# Patient Record
Sex: FEMALE | URBAN - METROPOLITAN AREA
[De-identification: names, ages, dates, MRNs, and addresses within clinical notes are randomized per-mention and may not be internally consistent; named-entity substitution may affect disease eponyms.]

---

## 2017-05-03 PROBLEM — 445243001 LEFT SIDED ULCERATIVE COLITIS: Status: ACTIVE | Noted: 2017-05-03

## 2022-04-30 ENCOUNTER — TELEPHONE ENCOUNTER (OUTPATIENT)
Dept: URBAN - METROPOLITAN AREA CLINIC 121 | Facility: CLINIC | Age: 57
End: 2022-04-30

## 2022-04-30 RX ORDER — MESALAMINE 800 MG/1
TABLET, DELAYED RELEASE ORAL
OUTPATIENT
Start: 2017-05-03

## 2022-04-30 RX ORDER — MESALAMINE 800 MG/1
TAKE 2 TABLETS PO TID TABLET, DELAYED RELEASE ORAL
OUTPATIENT
Start: 2019-06-27 | End: 2019-09-18

## 2022-04-30 RX ORDER — MESALAMINE 800 MG/1
TAKE 2 TABLETS PO TID TABLET, DELAYED RELEASE ORAL
OUTPATIENT
Start: 2017-05-30

## 2022-04-30 RX ORDER — MESALAMINE 800 MG/1
TAKE 2 TABLETS PO TID TABLET, DELAYED RELEASE ORAL
OUTPATIENT
Start: 2019-06-27

## 2022-05-01 ENCOUNTER — TELEPHONE ENCOUNTER (OUTPATIENT)
Dept: URBAN - METROPOLITAN AREA CLINIC 121 | Facility: CLINIC | Age: 57
End: 2022-05-01

## 2022-05-01 RX ORDER — MESALAMINE 800 MG/1
TAKE 2 TABLETS PO TID TABLET, DELAYED RELEASE ORAL
Status: ACTIVE | COMMUNITY
Start: 2017-05-30

## 2025-05-08 ENCOUNTER — DASHBOARD ENCOUNTERS (OUTPATIENT)
Age: 60
End: 2025-05-08

## 2025-05-08 ENCOUNTER — OFFICE VISIT (OUTPATIENT)
Dept: URBAN - METROPOLITAN AREA CLINIC 27 | Facility: CLINIC | Age: 60
End: 2025-05-08
Payer: COMMERCIAL

## 2025-05-08 DIAGNOSIS — K51.219 ULCERATIVE PROCTITIS WITH COMPLICATION: ICD-10-CM

## 2025-05-08 DIAGNOSIS — R19.7 ACUTE DIARRHEA: ICD-10-CM

## 2025-05-08 DIAGNOSIS — M06.9 RHEUMATOID ARTHRITIS, INVOLVING UNSPECIFIED SITE, UNSPECIFIED WHETHER RHEUMATOID FACTOR PRESENT: ICD-10-CM

## 2025-05-08 DIAGNOSIS — K21.9 CHRONIC GERD: ICD-10-CM

## 2025-05-08 DIAGNOSIS — A04.8 H. PYLORI INFECTION: ICD-10-CM

## 2025-05-08 PROBLEM — 235595009: Status: ACTIVE | Noted: 2025-05-08

## 2025-05-08 PROCEDURE — 99244 OFF/OP CNSLTJ NEW/EST MOD 40: CPT | Performed by: PHYSICIAN ASSISTANT

## 2025-05-08 PROCEDURE — 99204 OFFICE O/P NEW MOD 45 MIN: CPT | Performed by: PHYSICIAN ASSISTANT

## 2025-05-08 RX ORDER — MESALAMINE 4 G/60ML
AS DIRECTED SUSPENSION RECTAL NIGHTLY
Qty: 30 | Refills: 0 | OUTPATIENT
Start: 2025-05-14

## 2025-05-08 RX ORDER — MESALAMINE 800 MG/1
TAKE 2 TABLETS PO TID TABLET, DELAYED RELEASE ORAL
Status: ACTIVE | COMMUNITY
Start: 2017-05-30

## 2025-05-08 NOTE — HPI-TODAY'S VISIT:
Ms. De Paz is a 60-year-old former patient of Dr. Koroma. She was last seen in 2018. Shhas a history of ulcerative procitis and previously took Mesalamine 800 mg 2 po bid. She hasn't taken Mesalamine in the past 5 years. She takes Cimiza for Rheumatoid Arttitis. Her last colonosocpy in 2023 was negative for colitis. She is seen today in consultation at the request of Dr. Beka Escobar for suboptimally controlled reflux over the past 2 years. She has substernal burning and nocturnal reflux with vomitus that comes from her nose. She took Tums for her symptoms with marginal relief. She was found to be H pylori positive. She treated with Bismuth Subsuiliate, Flagyl, Omperazole and Tetracycline. She reports difficulty with one of the abx (diarrhea, nausea) and had to have a substitue.  She completed the therapy about a week. Mild improvement of her reflux. She reports new onset of diarrhea and nausea since starting the H pylori regimen. She has 3-4 stools per day that ranges in volume and consistency. She's seen drops of red blood on the toilet tissue and a small amount in the commode. She has nocturnal diarrhea.   Colonoscopy March 2023 with Dr. Kika Payan - diverticulosis and nonbleeding external hemorrhoids.

## 2025-05-09 LAB
BUN/CREATININE RATIO: (no result)
C-REACTIVE PROTEIN, QUANT: <3
CALCIUM: 10
CARBON DIOXIDE: 27
CHLORIDE: 107
CREATININE: 0.75
EGFR: 90
GLUCOSE: 90
HEMATOCRIT: 43.1
HEMOGLOBIN: 13.7
MCH: 26.6
MCHC: 31.8
MCV: 83.7
MPV: 11.1
PLATELET COUNT: 262
POTASSIUM: 4.6
RDW: 16.4
RED BLOOD CELL COUNT: 5.15
SODIUM: 143
UREA NITROGEN (BUN): 10
WHITE BLOOD CELL COUNT: 8

## 2025-05-14 ENCOUNTER — TELEPHONE ENCOUNTER (OUTPATIENT)
Dept: URBAN - METROPOLITAN AREA CLINIC 27 | Facility: CLINIC | Age: 60
End: 2025-05-14

## 2025-05-15 LAB
CALPROTECTIN, FECAL: 791
CLOSTRIDIUM DIFFICILE TOXINB,QL REAL TIME PCR: NOT DETECTED

## 2025-05-17 ENCOUNTER — TELEPHONE ENCOUNTER (OUTPATIENT)
Dept: URBAN - METROPOLITAN AREA CLINIC 27 | Facility: CLINIC | Age: 60
End: 2025-05-17

## 2025-05-17 RX ORDER — PREDNISONE 10 MG/1
TAKE 4 TABS PO X 1 WEEK, TAKE 3 TABS PO X 1 WEEK, TAKE 2 TABS PO X 1 WEEK, TAKE 1 TAB PO X 1 WEEK TABLET ORAL ONCE A DAY
Qty: 68 | Refills: 0 | OUTPATIENT
Start: 2025-05-17

## 2025-05-28 ENCOUNTER — CLAIMS CREATED FROM THE CLAIM WINDOW (OUTPATIENT)
Dept: URBAN - METROPOLITAN AREA CLINIC 4 | Facility: CLINIC | Age: 60
End: 2025-05-28
Payer: COMMERCIAL

## 2025-05-28 ENCOUNTER — OFFICE VISIT (OUTPATIENT)
Dept: URBAN - METROPOLITAN AREA SURGERY CENTER 7 | Facility: SURGERY CENTER | Age: 60
End: 2025-05-28

## 2025-05-28 ENCOUNTER — OFFICE VISIT (OUTPATIENT)
Dept: URBAN - METROPOLITAN AREA CLINIC 27 | Facility: CLINIC | Age: 60
End: 2025-05-28

## 2025-05-28 DIAGNOSIS — K51.80 OTHER ULCERATIVE COLITIS WITHOUT COMPLICATIONS: ICD-10-CM

## 2025-05-28 DIAGNOSIS — K63.89 OTHER SPECIFIED DISEASES OF INTESTINE: ICD-10-CM

## 2025-05-28 PROCEDURE — 88305 TISSUE EXAM BY PATHOLOGIST: CPT | Performed by: PATHOLOGY

## 2025-05-28 RX ORDER — MESALAMINE 4 G/60ML
AS DIRECTED SUSPENSION RECTAL NIGHTLY
Qty: 30 | Refills: 0 | Status: ACTIVE | COMMUNITY
Start: 2025-05-14

## 2025-05-28 RX ORDER — PREDNISONE 10 MG/1
TAKE 4 TABS PO X 1 WEEK, TAKE 3 TABS PO X 1 WEEK, TAKE 2 TABS PO X 1 WEEK, TAKE 1 TAB PO X 1 WEEK TABLET ORAL ONCE A DAY
Qty: 68 | Refills: 0 | Status: ACTIVE | COMMUNITY
Start: 2025-05-17

## 2025-05-28 RX ORDER — MESALAMINE 800 MG/1
TAKE 2 TABLETS PO TID TABLET, DELAYED RELEASE ORAL
Status: ACTIVE | COMMUNITY
Start: 2017-05-30

## 2025-06-04 ENCOUNTER — OFFICE VISIT (OUTPATIENT)
Dept: URBAN - METROPOLITAN AREA CLINIC 27 | Facility: CLINIC | Age: 60
End: 2025-06-04
Payer: COMMERCIAL

## 2025-06-04 DIAGNOSIS — K51.50 LEFT SIDED COLITIS WITHOUT COMPLICATIONS: ICD-10-CM

## 2025-06-04 DIAGNOSIS — K21.9 GASTRO-ESOPHAGEAL REFLUX DISEASE WITHOUT ESOPHAGITIS: ICD-10-CM

## 2025-06-04 DIAGNOSIS — K51.219 ULCERATIVE PROCTITIS WITH COMPLICATION: ICD-10-CM

## 2025-06-04 DIAGNOSIS — M06.9 RHEUMATOID ARTHRITIS, INVOLVING UNSPECIFIED SITE, UNSPECIFIED WHETHER RHEUMATOID FACTOR PRESENT: ICD-10-CM

## 2025-06-04 PROCEDURE — 99214 OFFICE O/P EST MOD 30 MIN: CPT | Performed by: INTERNAL MEDICINE

## 2025-06-04 RX ORDER — MESALAMINE 4 G/60ML
AS DIRECTED SUSPENSION RECTAL NIGHTLY
Qty: 30 | Refills: 0 | Status: ACTIVE | COMMUNITY
Start: 2025-05-14

## 2025-06-04 RX ORDER — PREDNISONE 10 MG/1
TAKE 4 TABS PO X 1 WEEK, TAKE 3 TABS PO X 1 WEEK, TAKE 2 TABS PO X 1 WEEK, TAKE 1 TAB PO X 1 WEEK TABLET ORAL ONCE A DAY
Qty: 68 | Refills: 0 | Status: ACTIVE | COMMUNITY
Start: 2025-05-17

## 2025-06-04 RX ORDER — MESALAMINE 375 MG/1
4 CAPSULES IN THE MORNING CAPSULE, EXTENDED RELEASE ORAL ONCE A DAY
Qty: 120 | OUTPATIENT
Start: 2025-06-04

## 2025-06-04 RX ORDER — MESALAMINE 800 MG/1
TAKE 2 TABLETS PO TID TABLET, DELAYED RELEASE ORAL
Status: ACTIVE | COMMUNITY
Start: 2017-05-30

## 2025-06-04 NOTE — HPI-HPI
Beatriz De Paz is a 60-year-old female who presents with ongoing symptoms related to her ulcerative proctitis and chronic GERD. She reports using Rowasa enemas nightly, which have provided some relief, but she continues to experience significant bleeding and frequent bowel movements, particularly after eating. Beatriz notes that she tries to limit her food intake to manage these symptoms. She is currently on Cimzia for rheumatoid arthritis, administered bi-weekly, and has been using omeprazole for acid reflux on an as-needed basis. However, she acknowledges breakthrough heartburn symptoms.  She also reports stomach cramping, which she associates with eating.

## 2025-06-05 LAB
C-REACTIVE PROTEIN, QUANT: 8.5
HEMATOCRIT: 43.3
HEMOGLOBIN: 14
MCH: 27.2
MCHC: 32.3
MCV: 84.1
MPV: 11
PLATELET COUNT: 235
RDW: 16
RED BLOOD CELL COUNT: 5.15
WHITE BLOOD CELL COUNT: 8.4

## 2025-06-06 ENCOUNTER — TELEPHONE ENCOUNTER (OUTPATIENT)
Dept: URBAN - METROPOLITAN AREA CLINIC 27 | Facility: CLINIC | Age: 60
End: 2025-06-06

## 2025-06-06 RX ORDER — OMEPRAZOLE 20 MG/1
1 TABLET 1/2 TO 1 HOUR BEFORE MORNING MEAL TABLET, DELAYED RELEASE ORAL ONCE A DAY
Qty: 30 | OUTPATIENT
Start: 2025-06-06

## 2025-06-11 ENCOUNTER — TELEPHONE ENCOUNTER (OUTPATIENT)
Dept: URBAN - METROPOLITAN AREA CLINIC 27 | Facility: CLINIC | Age: 60
End: 2025-06-11

## 2025-06-11 RX ORDER — ONDANSETRON 4 MG/1
1 TABLET ON THE TONGUE AND ALLOW TO DISSOLVE TABLET, ORALLY DISINTEGRATING ORAL ONCE A DAY
Qty: 30 | OUTPATIENT
Start: 2025-06-11

## 2025-06-19 ENCOUNTER — CLAIMS CREATED FROM THE CLAIM WINDOW (OUTPATIENT)
Dept: URBAN - METROPOLITAN AREA CLINIC 4 | Facility: CLINIC | Age: 60
End: 2025-06-19
Payer: COMMERCIAL

## 2025-06-19 ENCOUNTER — TELEPHONE ENCOUNTER (OUTPATIENT)
Dept: URBAN - METROPOLITAN AREA CLINIC 27 | Facility: CLINIC | Age: 60
End: 2025-06-19

## 2025-06-19 ENCOUNTER — CLAIMS CREATED FROM THE CLAIM WINDOW (OUTPATIENT)
Dept: URBAN - METROPOLITAN AREA SURGERY CENTER 7 | Facility: SURGERY CENTER | Age: 60
End: 2025-06-19
Payer: COMMERCIAL

## 2025-06-19 DIAGNOSIS — K51.919 ULCERATIVE COLITIS, UNSPECIFIED WITH UNSPECIFIED COMPLICATIONS: ICD-10-CM

## 2025-06-19 DIAGNOSIS — K51.511 LEFT SIDED ULCERATIVE COLITIS WITH RECTAL BLEEDING: ICD-10-CM

## 2025-06-19 PROCEDURE — 88305 TISSUE EXAM BY PATHOLOGIST: CPT | Performed by: PATHOLOGY

## 2025-06-19 PROCEDURE — 45331 SIGMOIDOSCOPY AND BIOPSY: CPT | Performed by: INTERNAL MEDICINE

## 2025-06-19 RX ORDER — PREDNISONE 10 MG/1
2 TABLETS TABLET ORAL ONCE A DAY
Qty: 60 TABLET | Refills: 1 | OUTPATIENT
Start: 2025-06-19

## 2025-06-19 RX ORDER — PREDNISONE 10 MG/1
TAKE 4 TABS PO X 1 WEEK, TAKE 3 TABS PO X 1 WEEK, TAKE 2 TABS PO X 1 WEEK, TAKE 1 TAB PO X 1 WEEK TABLET ORAL ONCE A DAY
Qty: 68 | Refills: 0 | Status: ACTIVE | COMMUNITY
Start: 2025-05-17

## 2025-06-19 RX ORDER — OMEPRAZOLE 20 MG/1
1 TABLET 1/2 TO 1 HOUR BEFORE MORNING MEAL TABLET, DELAYED RELEASE ORAL ONCE A DAY
Qty: 30 | Status: ACTIVE | COMMUNITY
Start: 2025-06-06

## 2025-06-19 RX ORDER — ONDANSETRON 4 MG/1
1 TABLET ON THE TONGUE AND ALLOW TO DISSOLVE TABLET, ORALLY DISINTEGRATING ORAL ONCE A DAY
Qty: 30 | Status: ACTIVE | COMMUNITY
Start: 2025-06-11

## 2025-06-19 RX ORDER — MESALAMINE 375 MG/1
4 CAPSULES IN THE MORNING CAPSULE, EXTENDED RELEASE ORAL ONCE A DAY
Qty: 120 | Status: ACTIVE | COMMUNITY
Start: 2025-06-04

## 2025-06-19 RX ORDER — MESALAMINE 800 MG/1
TAKE 2 TABLETS PO TID TABLET, DELAYED RELEASE ORAL
Status: ACTIVE | COMMUNITY
Start: 2017-05-30

## 2025-06-19 RX ORDER — MESALAMINE 4 G/60ML
AS DIRECTED SUSPENSION RECTAL NIGHTLY
Qty: 30 | Refills: 0 | Status: ACTIVE | COMMUNITY
Start: 2025-05-14

## 2025-06-25 LAB — CLOSTRIDIUM DIFFICILE: (no result)

## 2025-06-29 ENCOUNTER — ERX REFILL RESPONSE (OUTPATIENT)
Dept: URBAN - METROPOLITAN AREA CLINIC 27 | Facility: CLINIC | Age: 60
End: 2025-06-29

## 2025-06-29 RX ORDER — MESALAMINE 375 MG/1
4 CAPSULES IN THE MORNING CAPSULE, EXTENDED RELEASE ORAL ONCE A DAY
Qty: 120 | OUTPATIENT

## 2025-06-29 RX ORDER — MESALAMINE 0.38 G/1
4 CAPSULES IN THE MORNING ORALLY ONCE A DAY CAPSULE, EXTENDED RELEASE ORAL
Qty: 360 CAPSULE | Refills: 1 | OUTPATIENT

## 2025-07-10 ENCOUNTER — OFFICE VISIT (OUTPATIENT)
Dept: URBAN - METROPOLITAN AREA CLINIC 27 | Facility: CLINIC | Age: 60
End: 2025-07-10

## 2025-07-14 ENCOUNTER — ERX REFILL RESPONSE (OUTPATIENT)
Dept: URBAN - METROPOLITAN AREA CLINIC 27 | Facility: CLINIC | Age: 60
End: 2025-07-14

## 2025-07-14 RX ORDER — ONDANSETRON 4 MG/1
DISSOLVE 1 TABLET BY MOUTH ON THE TONGUE ONCE A DAY TABLET, ORALLY DISINTEGRATING ORAL
Qty: 30 TABLET | Refills: 0

## 2025-07-23 ENCOUNTER — OFFICE VISIT (OUTPATIENT)
Dept: URBAN - METROPOLITAN AREA CLINIC 27 | Facility: CLINIC | Age: 60
End: 2025-07-23
Payer: COMMERCIAL

## 2025-07-23 DIAGNOSIS — M06.9 RHEUMATOID ARTHRITIS, INVOLVING UNSPECIFIED SITE, UNSPECIFIED WHETHER RHEUMATOID FACTOR PRESENT: ICD-10-CM

## 2025-07-23 DIAGNOSIS — K21.9 GASTRO-ESOPHAGEAL REFLUX DISEASE WITHOUT ESOPHAGITIS: ICD-10-CM

## 2025-07-23 DIAGNOSIS — K51.50 LEFT SIDED COLITIS WITHOUT COMPLICATIONS: ICD-10-CM

## 2025-07-23 PROCEDURE — 99213 OFFICE O/P EST LOW 20 MIN: CPT | Performed by: INTERNAL MEDICINE

## 2025-07-23 RX ORDER — MESALAMINE 0.38 G/1
4 CAPSULES IN THE MORNING ORALLY ONCE A DAY CAPSULE, EXTENDED RELEASE ORAL
Qty: 360 CAPSULE | Refills: 1 | Status: ACTIVE | COMMUNITY

## 2025-07-23 RX ORDER — ONDANSETRON 4 MG/1
DISSOLVE 1 TABLET BY MOUTH ON THE TONGUE ONCE A DAY TABLET, ORALLY DISINTEGRATING ORAL
Qty: 30 TABLET | Refills: 0 | Status: ACTIVE | COMMUNITY

## 2025-07-23 RX ORDER — MESALAMINE 800 MG/1
TAKE 2 TABLETS PO TID TABLET, DELAYED RELEASE ORAL
Status: ACTIVE | COMMUNITY
Start: 2017-05-30

## 2025-07-23 RX ORDER — PREDNISONE 10 MG/1
TAKE 4 TABS PO X 1 WEEK, TAKE 3 TABS PO X 1 WEEK, TAKE 2 TABS PO X 1 WEEK, TAKE 1 TAB PO X 1 WEEK TABLET ORAL ONCE A DAY
Qty: 68 | Refills: 0 | Status: ACTIVE | COMMUNITY
Start: 2025-05-17

## 2025-07-23 RX ORDER — PREDNISONE 10 MG/1
2 TABLETS TABLET ORAL ONCE A DAY
Qty: 60 TABLET | Refills: 1 | Status: ACTIVE | COMMUNITY
Start: 2025-06-19

## 2025-07-23 RX ORDER — MESALAMINE 4 G/60ML
AS DIRECTED SUSPENSION RECTAL NIGHTLY
Qty: 30 | Refills: 0 | Status: ACTIVE | COMMUNITY
Start: 2025-05-14

## 2025-07-23 RX ORDER — OMEPRAZOLE 20 MG/1
1 TABLET 1/2 TO 1 HOUR BEFORE MORNING MEAL TABLET, DELAYED RELEASE ORAL ONCE A DAY
Qty: 30 | Status: ACTIVE | COMMUNITY
Start: 2025-06-06

## 2025-07-23 NOTE — HPI-TODAY'S VISIT:
This is a 60-year-old female with a history of ulcerative colitis who presents in follow-up today with continued symptoms.  She is trying the enema and mesalamine pills.  She did not feel they helped.  She is on steroid she is currently on 2 pills a day she is also on Cimzia for her arthritis.  Generally she feels she has no energy.  She feels she has bright red bowel movements 2-3 times a day.  However she has a recent CBC with a normal hemoglobin of 12.8 and a CRP of 7 in the normal range.  In May she had a calprotectin of 791.Commend sigmoidoscopy in June showed active colitis

## 2025-07-24 ENCOUNTER — TELEPHONE ENCOUNTER (OUTPATIENT)
Dept: URBAN - METROPOLITAN AREA CLINIC 27 | Facility: CLINIC | Age: 60
End: 2025-07-24

## 2025-07-24 RX ORDER — RISANKIZUMAB-RZAA 60 MG/ML
AS DIRECTED INJECTION INTRAVENOUS
Qty: 1200 | Refills: 0 | OUTPATIENT
Start: 2025-07-24 | End: 2025-07-24

## 2025-07-29 LAB
HEPATITIS B SURFACE ANTIGEN: (no result)
MITOGEN-NIL: 7.98
QUANTIFERON NIL VALUE: 0.17
QUANTIFERON TB1 AG VALUE: 0.02
QUANTIFERON TB2 AG VALUE: 0
QUANTIFERON-TB GOLD PLUS: NEGATIVE

## 2025-07-30 ENCOUNTER — TELEPHONE ENCOUNTER (OUTPATIENT)
Dept: URBAN - METROPOLITAN AREA CLINIC 27 | Facility: CLINIC | Age: 60
End: 2025-07-30

## 2025-07-31 ENCOUNTER — OFFICE VISIT (OUTPATIENT)
Dept: URBAN - METROPOLITAN AREA CLINIC 27 | Facility: CLINIC | Age: 60
End: 2025-07-31

## 2025-07-31 RX ORDER — HYOSCYAMINE SULFATE 0.12 MG/1
1 TABLET AS NEEDED FOR ABDOMINAL PAIN TABLET ORAL
Qty: 90 TABLET | Refills: 3 | OUTPATIENT
Start: 2025-07-31

## 2025-07-31 RX ORDER — PREDNISONE 10 MG/1
2 TABLETS TABLET ORAL ONCE A DAY
Qty: 60 TABLET | Refills: 1 | Status: ACTIVE | COMMUNITY
Start: 2025-06-19

## 2025-07-31 RX ORDER — OMEPRAZOLE 20 MG/1
1 TABLET 1/2 TO 1 HOUR BEFORE MORNING MEAL TABLET, DELAYED RELEASE ORAL ONCE A DAY
Qty: 30 | Status: ACTIVE | COMMUNITY
Start: 2025-06-06

## 2025-07-31 RX ORDER — PREDNISONE 10 MG/1
TAKE 4 TABS PO X 1 WEEK, TAKE 3 TABS PO X 1 WEEK, TAKE 2 TABS PO X 1 WEEK, TAKE 1 TAB PO X 1 WEEK TABLET ORAL ONCE A DAY
Qty: 68 | Refills: 0 | Status: ACTIVE | COMMUNITY
Start: 2025-05-17

## 2025-07-31 RX ORDER — MESALAMINE 800 MG/1
TAKE 2 TABLETS PO TID TABLET, DELAYED RELEASE ORAL
Status: ACTIVE | COMMUNITY
Start: 2017-05-30

## 2025-07-31 RX ORDER — ONDANSETRON 4 MG/1
DISSOLVE 1 TABLET BY MOUTH ON THE TONGUE ONCE A DAY TABLET, ORALLY DISINTEGRATING ORAL
Qty: 30 TABLET | Refills: 0 | Status: ACTIVE | COMMUNITY

## 2025-07-31 RX ORDER — MESALAMINE 4 G/60ML
AS DIRECTED SUSPENSION RECTAL NIGHTLY
Qty: 30 | Refills: 0 | Status: ACTIVE | COMMUNITY
Start: 2025-05-14

## 2025-07-31 RX ORDER — MESALAMINE 0.38 G/1
4 CAPSULES IN THE MORNING ORALLY ONCE A DAY CAPSULE, EXTENDED RELEASE ORAL
Qty: 360 CAPSULE | Refills: 1 | Status: ACTIVE | COMMUNITY

## 2025-07-31 NOTE — HPI-TODAY'S VISIT:
Ms. is a 60-year-old established patient with UC & RA who recnetly stopped Cimizia at the advise of Dr. Koroma and her rheumatologist. She is due to start Skizi. She presents today with abdominal cramping and post-prandial muddy stools 4-6 times per day. Blood is present each time she defecates. She in't isnt anemic as of 7/1She also has 1-2 post-prandial stools. She is currently taking Presnisone 20 mg per day and enemas 5 days per week without resolution of her symptoms.   on 7/17 - CRP 7, Sed Rate 29, Hgb 12.8, Hct 42, WBC 12.1, .    Generally she feels she has no energy.  She feels she has bright red bowel movements 2-3 times a day.  However she has a recent CBC with a normal hemoglobin of 12.8 and a CRP of 7 in the normal range.  In May she had a calprotectin of 791.Commend sigmoidoscopy in June showed active colitis

## 2025-08-14 ENCOUNTER — OFFICE VISIT (OUTPATIENT)
Dept: URBAN - METROPOLITAN AREA CLINIC 91 | Facility: CLINIC | Age: 60
End: 2025-08-14
Payer: COMMERCIAL

## 2025-08-14 DIAGNOSIS — K51.219 ULCERATIVE PROCTITIS WITH COMPLICATION: ICD-10-CM

## 2025-08-14 DIAGNOSIS — K51.50 LEFT SIDED COLITIS WITHOUT COMPLICATIONS: ICD-10-CM

## 2025-08-14 PROCEDURE — 96413 CHEMO IV INFUSION 1 HR: CPT | Performed by: INTERNAL MEDICINE

## 2025-08-14 PROCEDURE — 96415 CHEMO IV INFUSION ADDL HR: CPT | Performed by: INTERNAL MEDICINE

## 2025-08-14 RX ORDER — HYOSCYAMINE SULFATE 0.12 MG/1
1 TABLET AS NEEDED FOR ABDOMINAL PAIN TABLET ORAL
Qty: 90 TABLET | Refills: 3 | Status: ACTIVE | COMMUNITY
Start: 2025-07-31

## 2025-08-14 RX ORDER — MESALAMINE 4 G/60ML
AS DIRECTED SUSPENSION RECTAL NIGHTLY
Qty: 30 | Refills: 0 | Status: ACTIVE | COMMUNITY
Start: 2025-05-14

## 2025-08-14 RX ORDER — PREDNISONE 10 MG/1
TAKE 4 TABS PO X 1 WEEK, TAKE 3 TABS PO X 1 WEEK, TAKE 2 TABS PO X 1 WEEK, TAKE 1 TAB PO X 1 WEEK TABLET ORAL ONCE A DAY
Qty: 68 | Refills: 0 | Status: ACTIVE | COMMUNITY
Start: 2025-05-17

## 2025-08-14 RX ORDER — OMEPRAZOLE 20 MG/1
1 TABLET 1/2 TO 1 HOUR BEFORE MORNING MEAL TABLET, DELAYED RELEASE ORAL ONCE A DAY
Qty: 30 | Status: ACTIVE | COMMUNITY
Start: 2025-06-06

## 2025-08-14 RX ORDER — MESALAMINE 800 MG/1
TAKE 2 TABLETS PO TID TABLET, DELAYED RELEASE ORAL
Status: ACTIVE | COMMUNITY
Start: 2017-05-30

## 2025-08-14 RX ORDER — ONDANSETRON 4 MG/1
DISSOLVE 1 TABLET BY MOUTH ON THE TONGUE ONCE A DAY TABLET, ORALLY DISINTEGRATING ORAL
Qty: 30 TABLET | Refills: 0 | Status: ACTIVE | COMMUNITY

## 2025-08-14 RX ORDER — MESALAMINE 0.38 G/1
4 CAPSULES IN THE MORNING ORALLY ONCE A DAY CAPSULE, EXTENDED RELEASE ORAL
Qty: 360 CAPSULE | Refills: 1 | Status: ACTIVE | COMMUNITY

## 2025-08-14 RX ORDER — PREDNISONE 10 MG/1
2 TABLETS TABLET ORAL ONCE A DAY
Qty: 60 TABLET | Refills: 1 | Status: ACTIVE | COMMUNITY
Start: 2025-06-19

## 2025-08-20 ENCOUNTER — ERX REFILL RESPONSE (OUTPATIENT)
Dept: URBAN - METROPOLITAN AREA CLINIC 27 | Facility: CLINIC | Age: 60
End: 2025-08-20

## 2025-08-20 RX ORDER — PREDNISONE 10 MG/1
TAKE 2 TABLETS BY MOUTH EVERY DAY TABLET ORAL
Qty: 60 TABLET | Refills: 1

## 2025-08-21 ENCOUNTER — OFFICE VISIT (OUTPATIENT)
Dept: URBAN - METROPOLITAN AREA CLINIC 27 | Facility: CLINIC | Age: 60
End: 2025-08-21
Payer: COMMERCIAL

## 2025-08-21 ENCOUNTER — ERX REFILL RESPONSE (OUTPATIENT)
Dept: URBAN - METROPOLITAN AREA CLINIC 27 | Facility: CLINIC | Age: 60
End: 2025-08-21

## 2025-08-21 DIAGNOSIS — K51.00 ULCERATIVE (CHRONIC) PANCOLITIS WITHOUT COMPLICATIONS: ICD-10-CM

## 2025-08-21 DIAGNOSIS — K92.1 HEMATOCHEZIA: ICD-10-CM

## 2025-08-21 DIAGNOSIS — K21.9 CHRONIC GERD: ICD-10-CM

## 2025-08-21 DIAGNOSIS — M06.9 RHEUMATOID ARTHRITIS, INVOLVING UNSPECIFIED SITE, UNSPECIFIED WHETHER RHEUMATOID FACTOR PRESENT: ICD-10-CM

## 2025-08-21 PROCEDURE — 99214 OFFICE O/P EST MOD 30 MIN: CPT | Performed by: INTERNAL MEDICINE

## 2025-08-21 RX ORDER — OMEPRAZOLE 20 MG/1
1 TABLET 1/2 TO 1 HOUR BEFORE MORNING MEAL TABLET, DELAYED RELEASE ORAL ONCE A DAY
Qty: 30 | Status: ACTIVE | COMMUNITY
Start: 2025-06-06

## 2025-08-21 RX ORDER — MESALAMINE 4 G/60ML
AS DIRECTED SUSPENSION RECTAL NIGHTLY
Qty: 30 | Refills: 0 | Status: ACTIVE | COMMUNITY
Start: 2025-05-14

## 2025-08-21 RX ORDER — ONDANSETRON 4 MG/1
DISSOLVE 1 TABLET BY MOUTH ON THE TONGUE ONCE A DAY TABLET, ORALLY DISINTEGRATING ORAL
Qty: 30 TABLET | Refills: 0 | Status: ACTIVE | COMMUNITY

## 2025-08-21 RX ORDER — ONDANSETRON 4 MG/1
DISSOLVE 1 TABLET BY MOUTH ON THE TONGUE ONCE A DAY TABLET, ORALLY DISINTEGRATING ORAL
Qty: 30 TABLET | Refills: 0

## 2025-08-21 RX ORDER — HYOSCYAMINE SULFATE 0.12 MG/1
1 TABLET AS NEEDED FOR ABDOMINAL PAIN TABLET ORAL
Qty: 90 TABLET | Refills: 3 | Status: ACTIVE | COMMUNITY
Start: 2025-07-31

## 2025-08-21 RX ORDER — PREDNISONE 10 MG/1
TAKE 2 TABLETS BY MOUTH EVERY DAY TABLET ORAL
Qty: 60 TABLET | Refills: 1 | Status: ACTIVE | COMMUNITY

## 2025-08-21 RX ORDER — MESALAMINE 0.38 G/1
4 CAPSULES IN THE MORNING ORALLY ONCE A DAY CAPSULE, EXTENDED RELEASE ORAL
Qty: 360 CAPSULE | Refills: 1 | Status: ACTIVE | COMMUNITY

## 2025-08-21 RX ORDER — MESALAMINE 800 MG/1
TAKE 2 TABLETS PO TID TABLET, DELAYED RELEASE ORAL
Status: ACTIVE | COMMUNITY
Start: 2017-05-30

## 2025-08-22 LAB
A/G RATIO: 1.4
ABSOLUTE BASOPHILS: 51
ABSOLUTE EOSINOPHILS: 179
ABSOLUTE LYMPHOCYTES: 4216
ABSOLUTE MONOCYTES: 816
ABSOLUTE NEUTROPHILS: 3239
ALBUMIN: 3.9
ALKALINE PHOSPHATASE: 85
ALT (SGPT): 51
AST (SGOT): 41
BASOPHILS: 0.6
BILIRUBIN, TOTAL: 0.3
BUN/CREATININE RATIO: (no result)
BUN: 15
C-REACTIVE PROTEIN, QUANT: 9
CALCIUM: 9.4
CARBON DIOXIDE, TOTAL: 24
CHLORIDE: 110
CREATININE: 0.8
EGFR: 83
EOSINOPHILS: 2.1
FERRITIN, SERUM: 23
GLOBULIN, TOTAL: 2.8
GLUCOSE: 84
HEMATOCRIT: 39.2
HEMOGLOBIN: 12.6
IRON BIND.CAP.(TIBC): 389
IRON SATURATION: 6
IRON: 24
LYMPHOCYTES: 49.6
MCH: 26.1
MCHC: 32.1
MCV: 81.2
MONOCYTES: 9.6
MPV: 11
NEUTROPHILS: 38.1
PLATELET COUNT: 260
POTASSIUM: 4.1
PROTEIN, TOTAL: 6.7
RDW: 14.7
RED BLOOD CELL COUNT: 4.83
SODIUM: 144
WHITE BLOOD CELL COUNT: 8.5